# Patient Record
Sex: FEMALE
[De-identification: names, ages, dates, MRNs, and addresses within clinical notes are randomized per-mention and may not be internally consistent; named-entity substitution may affect disease eponyms.]

---

## 2018-02-21 ENCOUNTER — HOSPITAL ENCOUNTER (EMERGENCY)
Dept: HOSPITAL 89 - ER | Age: 18
Discharge: HOME | End: 2018-02-21
Payer: COMMERCIAL

## 2018-02-21 VITALS — SYSTOLIC BLOOD PRESSURE: 118 MMHG | DIASTOLIC BLOOD PRESSURE: 78 MMHG

## 2018-02-21 VITALS — BODY MASS INDEX: 20.46 KG/M2 | HEIGHT: 68 IN | WEIGHT: 135 LBS

## 2018-02-21 DIAGNOSIS — J04.0: Primary | ICD-10-CM

## 2018-02-21 PROCEDURE — 99283 EMERGENCY DEPT VISIT LOW MDM: CPT

## 2018-02-21 PROCEDURE — 96360 HYDRATION IV INFUSION INIT: CPT

## 2018-02-21 NOTE — ER REPORT
History and Physical


Time Seen By MD:  10:30


Hx. of Stated Complaint:  


seen at urgent care yesterday. diagnosised with inf B. has no voice, sob


HPI/ROS


CHIEF COMPLAINT: Difficulty speaking





HISTORY OF PRESENT ILLNESS: 17-year-old, comes emergency Department today with 

a complaint of laryngitis. Patient states she is using diagnosed with strep and 

with influenza she's on Tamiflu and an antibiotic azithromycin secondary to 

penicillin allergy said for the last 12-24 hours difficulty speaking and had 

laryngitis morning nonproductive cough and low-grade fever otherwise 

unremarkable





REVIEW OF SYSTEMS:


Respiratory: Nonproductive cough no shortness of breath


Cardiovascular: No chest pain, no palpitations.


Gastrointestinal: No vomiting, no abdominal pain.


Musculoskeletal: No back pain.


Remainder of the 14 system rev:  Yes


Allergies:  


Coded Allergies:  


     Penicillins (Verified  Allergy, Intermediate, 2/21/18)


     triamcinolone (Verified  Allergy, Intermediate, 2/21/18)


Home Meds


No Active Prescriptions or Reported Meds


Reviewed Nurses Notes:  Yes


Old Medical Records Reviewed:  Yes


Hx Substance Use Disorder:  No


Hx Alcohol Use:  No


Constitutional





Vital Sign - Last 24 Hours








 2/21/18 2/21/18 2/21/18 2/21/18





 10:39 10:41 10:50 11:05


 


Temp 101.9   


 


Pulse 112  114 104


 


Resp 16   


 


B/P (MAP) 135/84 135/84 (101)  


 


Pulse Ox 93  95 97


 


O2 Delivery Room Air   


 


    





 2/21/18   





 11:38   


 


Temp 99.3   














Intake and Output   


 


 2/21/18 2/21/18 2/22/18





 15:00 23:00 07:00


 


Intake Total 1000 ml  


 


Balance 1000 ml  








Physical Exam


  General Appearance: The patient is alert, has no immediate need for airway 

protection and no current signs of toxicity.  [ ]


Eyes: Pupils equal and round no injection.


Respiratory: Chest is non tender, lungs are clear to auscultation.


Cardiac: regular rate and rhythm [ ]


Gastrointestinal: Abdomen is soft and non tender, no masses, bowel sounds 

normal.


Musculoskeletal:  Neck: Neck is supple and non tender.


   Extremities have full range of motion and are non tender.


Skin: No rashes or lesions.


[ ]


DIFFERENTIAL DIAGNOSIS: After history and physical exam differential diagnosis 

was considered for laryngitis pharyngitis strep and influenza





Medical Decision Making


ED Course/Re-evaluation


ED Course


ED clinical course patient seemed a bit dehydrated here some fluids 

antipyretics we'll switch her off the azithromycin to clindamycin I think it's 

a better antibiotic for pharyngitis and her strep throat advised her to take 

antipyretics as needed plenty of rest up with primary care


Decision to Disposition Date:  Feb 21, 2018


Decision to Disposition Time:  12:09





Depart


Departure


Latest Vital Signs





Vital Signs








  Date Time  Temp Pulse Resp B/P (MAP) Pulse Ox O2 Delivery O2 Flow Rate FiO2


 


2/21/18 11:38 99.3       


 


2/21/18 11:05  104   97   


 


2/21/18 10:41    135/84 (101)    


 


2/21/18 10:39   16   Room Air  








Impression:  


 Primary Impression:  


 Laryngitis


Condition:  Improved


Disposition:  HOME OR SELF-CARE


New Scripts


No Active Prescriptions or Reported Meds


Patient Instructions:  Laryngitis (DC)











JULIETTE MARLEY MD Feb 21, 2018 12:11